# Patient Record
Sex: FEMALE | Race: WHITE | Employment: FULL TIME | URBAN - METROPOLITAN AREA
[De-identification: names, ages, dates, MRNs, and addresses within clinical notes are randomized per-mention and may not be internally consistent; named-entity substitution may affect disease eponyms.]

---

## 2020-10-01 ENCOUNTER — TELEPHONE (OUTPATIENT)
Dept: ADMINISTRATIVE | Age: 25
End: 2020-10-01

## 2020-10-12 ENCOUNTER — HOSPITAL ENCOUNTER (OUTPATIENT)
Age: 25
Discharge: HOME | End: 2020-10-14

## 2020-10-12 LAB
ALBUMIN SERPL-MCNC: 4.3 G/DL (ref 3.5–5.2)
ALP BLD-CCNC: 60 U/L (ref 35–104)
ALT SERPL-CCNC: 15 U/L (ref 0–32)
ANION GAP SERPL CALCULATED.3IONS-SCNC: 9 MMOL/L (ref 7–16)
AST SERPL-CCNC: 16 U/L (ref 0–31)
BILIRUB SERPL-MCNC: 0.2 MG/DL (ref 0–1.2)
BUN BLDV-MCNC: 12 MG/DL (ref 6–20)
CALCIUM SERPL-MCNC: 9.6 MG/DL (ref 8.6–10.2)
CHLORIDE BLD-SCNC: 103 MMOL/L (ref 98–107)
CHLORIDE URINE RANDOM: 110 MMOL/L
CO2: 25 MMOL/L (ref 22–29)
CREAT SERPL-MCNC: 0.8 MG/DL (ref 0.5–1)
GFR AFRICAN AMERICAN: >60
GFR NON-AFRICAN AMERICAN: >60 ML/MIN/1.73
GLUCOSE BLD-MCNC: 88 MG/DL (ref 74–99)
POTASSIUM SERPL-SCNC: 5 MMOL/L (ref 3.5–5)
POTASSIUM, UR: 21.7 MMOL/L
SODIUM BLD-SCNC: 137 MMOL/L (ref 132–146)
SODIUM URINE: 124 MMOL/L
TOTAL PROTEIN: 6.7 G/DL (ref 6.4–8.3)

## 2020-10-12 PROCEDURE — 82436 ASSAY OF URINE CHLORIDE: CPT

## 2020-10-12 PROCEDURE — 83935 ASSAY OF URINE OSMOLALITY: CPT

## 2020-10-12 PROCEDURE — 84300 ASSAY OF URINE SODIUM: CPT

## 2020-10-12 PROCEDURE — 84133 ASSAY OF URINE POTASSIUM: CPT

## 2020-10-12 PROCEDURE — 80053 COMPREHEN METABOLIC PANEL: CPT

## 2020-10-15 LAB — OSMOLALITY URINE: 478 MOSM/KG (ref 300–900)

## 2020-10-19 ENCOUNTER — HOSPITAL ENCOUNTER (OUTPATIENT)
Age: 25
Discharge: HOME | End: 2020-10-21

## 2020-10-19 LAB
ANION GAP SERPL CALCULATED.3IONS-SCNC: 15 MMOL/L (ref 7–16)
BACTERIA: ABNORMAL /HPF
BILIRUBIN URINE: NEGATIVE
BLOOD, URINE: NEGATIVE
BUN BLDV-MCNC: 9 MG/DL (ref 6–20)
CALCIUM SERPL-MCNC: 9.7 MG/DL (ref 8.6–10.2)
CHLORIDE BLD-SCNC: 104 MMOL/L (ref 98–107)
CLARITY: CLEAR
CO2: 23 MMOL/L (ref 22–29)
COLOR: YELLOW
CREAT SERPL-MCNC: 0.8 MG/DL (ref 0.5–1)
EPITHELIAL CELLS, UA: ABNORMAL /HPF
GFR AFRICAN AMERICAN: >60
GFR NON-AFRICAN AMERICAN: >60 ML/MIN/1.73
GLUCOSE BLD-MCNC: 93 MG/DL (ref 74–99)
GLUCOSE URINE: NEGATIVE MG/DL
KETONES, URINE: NEGATIVE MG/DL
LEUKOCYTE ESTERASE, URINE: ABNORMAL
NITRITE, URINE: POSITIVE
OSMOLALITY URINE: 266 MOSM/KG (ref 300–900)
OSMOLALITY: 292 MOSM/KG (ref 285–310)
PH UA: 6.5 (ref 5–9)
POTASSIUM SERPL-SCNC: 4.5 MMOL/L (ref 3.5–5)
PROTEIN UA: NEGATIVE MG/DL
RBC UA: ABNORMAL /HPF (ref 0–2)
SODIUM BLD-SCNC: 142 MMOL/L (ref 132–146)
SPECIFIC GRAVITY UA: 1.01 (ref 1–1.03)
UROBILINOGEN, URINE: 0.2 E.U./DL
WBC UA: ABNORMAL /HPF (ref 0–5)

## 2020-10-19 PROCEDURE — 83930 ASSAY OF BLOOD OSMOLALITY: CPT

## 2020-10-19 PROCEDURE — 81001 URINALYSIS AUTO W/SCOPE: CPT

## 2020-10-19 PROCEDURE — 80048 BASIC METABOLIC PNL TOTAL CA: CPT

## 2020-10-19 PROCEDURE — 84133 ASSAY OF URINE POTASSIUM: CPT

## 2020-10-19 PROCEDURE — 82436 ASSAY OF URINE CHLORIDE: CPT

## 2020-10-19 PROCEDURE — 84166 PROTEIN E-PHORESIS/URINE/CSF: CPT

## 2020-10-19 PROCEDURE — 83935 ASSAY OF URINE OSMOLALITY: CPT

## 2020-10-19 PROCEDURE — 84300 ASSAY OF URINE SODIUM: CPT

## 2020-10-23 LAB — ADDENDUM ELECTROPHORESIS URINE RANDOM: NORMAL

## 2020-11-02 ENCOUNTER — HOSPITAL ENCOUNTER (OUTPATIENT)
Dept: PHYSICAL THERAPY | Age: 25
Setting detail: THERAPIES SERIES
Discharge: HOME | End: 2020-11-02
Payer: COMMERCIAL

## 2020-11-04 ENCOUNTER — HOSPITAL ENCOUNTER (OUTPATIENT)
Dept: PHYSICAL THERAPY | Age: 25
Setting detail: THERAPIES SERIES
Discharge: HOME | End: 2020-11-04
Payer: COMMERCIAL

## 2020-11-04 PROCEDURE — 97161 PT EVAL LOW COMPLEX 20 MIN: CPT

## 2020-11-04 NOTE — PROGRESS NOTES
shoulder girdle elevation No acute guarding noted    PROM RUE (degrees)  RUE PROM: WFL  AROM RUE (degrees)  RUE AROM : WFL  PROM LUE (degrees)  LUE PROM: WFL  AROM LUE (degrees)  LUE AROM : WFL  Spine  Cervical: Flex limtied 20% Extension limited 30% Rotation limited 50% bilateral with pain    Strength RLE  Comment: 4-/5  Strength LLE  Comment: 4-/5  Strength Other  Other: Posterior cervical/scapular region 3+/5                                                   Assessment   Conditions Requiring Skilled Therapeutic Intervention  Body structures, Functions, Activity limitations: Decreased functional mobility ; Increased pain;Decreased posture;Decreased ROM; Decreased strength;Decreased endurance  Assessment: Acute postero-lateral cervicla pain with persistent headaches  Prognosis: Fair  Decision Making: Low Complexity  REQUIRES PT FOLLOW UP: Yes         Plan   Plan  Times per week: 2-3  Plan weeks: 4-5  Current Treatment Recommendations: Strengthening, Home Exercise Program, Manual Therapy - Soft Tissue Mobilization, ROM, Patient/Caregiver Education & Training, Modalities    G-Code       OutComes Score                                                  AM-PAC Score             Goals          Therapy Time   Individual Concurrent Group Co-treatment   Time In 1500         Time Out 1535         Minutes 35         Timed Code Treatment Minutes: 27 Minutes       Eze Ray, PT

## 2020-11-13 NOTE — FLOWSHEET NOTE
[facility]  Phone: [phone] Fax: [phone]     [facility] Initial Evaluation      Client Name:  Anshul Vale   Claim Number:[id number]  Evaluation date:11/04/2020                                     Job Title : LPN  Diagnosis:Cervical Injury                                       Normal Work Hrs:  40/wk  Referring Physician: Dr Darryl Castellano                          Present work status:   ________  First date of Lost time:09/22/2020                          * Not working  ___  Date of Injury: 09/22/2020                                         Darra Males Duty    ____  [place].  Duty     __X__    Authorized Services:   _X__ Physical Therapy Exercises  ___ Work Conditioning  ___ Aquatics  _X__ Manual therapy  _X__ Electrical Stimulation, Traction, Ultrasound  __X_ Education/Body mechanics  ___ Ergonomic Assessment/FCE    Authorized Visits: ________  Janak Martinez __12_____Visits  By ____12/04/2020  Janak Martinez From __11/04/2020___ to_12/04/2020    Vocational Status:  Employer: [employer]  Job Title: LPN     Rehabilitation Problems/Impairments:  [x]Pain  3-5/10 Posterior and postero-lateral cervical regions Headaches   [x]Decreased ROM:Cervical region   []Joint Hypomobility:______________________________________  []Joint Hypermobility:______________________________________  []Muscle Spasms:_________________________________________  []Gait: __________________________________________________  [x]Decreased strength:posterior cervical/scapualr region   []Unsafe body mechanics related to work/home activities  []Subjective reports of pain limiting work/home activities  []Inability to control onset symptoms  []Load handling strength levels below employable levels  []Work endurances less than required for employment levels  [x]Other:Postural deficits       Short Term Rehabilitation Goals:                    [x] Decrease pain 3/10:______________________________________                    [] Increase ROM:___________________________________________                    [] Improve Joint play:________________________________________                    [] Improve strength:_________________________________________                    []Gait: __________________________________________________                    [] Reduce Muscle Spasms:____________________________________                    [] Improve Body Mechanics associated with work/Home activities. [x] Instruct with symptom control techniques/ HEP                    [x] Improve endurance levels                     [] Lift floor-waist __________#                     [] Lift waist-shoulder _______#                    [] Horizontal carry _________Ft ________#                    [] Elevated work ________min _________#                    [] Other___________________________________    Long Term Rehabilitation Goals:   []RTW  ( SJ/SE, SJ/DE, DJ/SE, DJ/DE)  [x]Decrease Pain _2_/10 ____________________________________  [x]Improve AROM to [facility]   [x]Improve Strength to 3+ to 4-/5   []Improve Joint play (WNL), _______________________________  []Body Mechanics:________________________________________              []Gait ___________________________________________________  [x]Independent with HEP/Symptom control techniques:  []Lift Floor-waist_____#  []Lift waist-Shoulder________#  []Horizontal Carry ______ft______#  []Elevated work ____min____#  []Other:_________________________________________________    Rehabilitation Recommendations:                    ?[x] Begin PT at __2-3____x/wk x _4_____wks. []Begin PT with Work Conditioning to follow at ______wks. []Begin Work-Conditioning and conclude with FCE _____                   []? Begin PT with Vocational Rehabilitation referral/request--[facility]                   ? [] Ergonomic Study/Job Analysis to compare FCE/clients abilities. ____    Recommendations For RTW accommodations:  KIZZY          Physician: ____________________  Therapist: Loreen Dubin, [address] [facility]  : ____________________

## 2020-11-18 ENCOUNTER — HOSPITAL ENCOUNTER (OUTPATIENT)
Dept: PHYSICAL THERAPY | Age: 25
Setting detail: THERAPIES SERIES
Discharge: HOME | End: 2020-11-18
Payer: COMMERCIAL

## 2020-11-18 PROCEDURE — 97110 THERAPEUTIC EXERCISES: CPT

## 2020-11-18 PROCEDURE — G0283 ELEC STIM OTHER THAN WOUND: HCPCS

## 2020-11-18 NOTE — PROGRESS NOTES
Neuro Re-education NR     Modalities 20 1   Non-Billable Service Time     Other 10 --   Total Time/Units 50 2     Electronically signed by:   Laurel Waite

## 2020-11-20 ENCOUNTER — TELEPHONE (OUTPATIENT)
Dept: PHYSICAL MEDICINE AND REHAB | Age: 25
End: 2020-11-20

## 2020-11-20 ENCOUNTER — HOSPITAL ENCOUNTER (OUTPATIENT)
Dept: PHYSICAL THERAPY | Age: 25
Setting detail: THERAPIES SERIES
Discharge: HOME | End: 2020-11-20
Payer: COMMERCIAL

## 2020-11-20 NOTE — PROGRESS NOTES
[facility]  Phone: [phone] Fax: [phone]     Physical Therapy  Cancellation/No-show Note  Patient Name:  Bobby Sawyer  :  1995   Date:  2020    For today's appointment patient:  [x]  Cancelled  []  Rescheduled appointment  []  No-show     Reason given by patient:  []  Patient ill  []  Conflicting appointment  []  No transportation    []  Conflict with work  []  No reason given  [x]  Other:     Comments: Illness in the family                                    Next scheduled appointment 2020    Electronically signed by:  Bradley Schultz, [address] [facility]

## 2020-11-20 NOTE — TELEPHONE ENCOUNTER
Pt called she thought her appt for 11-19-20 was on 11-20-20. This is a NP concussion with impact testing.   Please call her back to r/s [phone]

## 2020-11-24 ENCOUNTER — HOSPITAL ENCOUNTER (OUTPATIENT)
Dept: PHYSICAL THERAPY | Age: 25
Setting detail: THERAPIES SERIES
Discharge: HOME | End: 2020-11-24
Payer: COMMERCIAL

## 2020-11-24 PROCEDURE — 97110 THERAPEUTIC EXERCISES: CPT

## 2020-11-24 NOTE — PROGRESS NOTES
[facility]  Phone: [phone] Fax: [phone]       Physical Therapy Daily Treatment Note  Date:  2020    Patient Name:  Magalie Love    :  1995  MRN: [id number]    Restrictions/Precautions:    Diagnosis:  Neck Injury  Treatment Diagnosis:    Insurance/Certification information: [employer] (12 visits thru 2020)   Referring Physician:  Dr. Muniz Risk of care signed (Y/N):    Visit# / total visits:  3/12  Pain level: /10  Time In: 13:30       Time Out: 14:05         Subjective:  Pt states she has no pain this afternoon. Pt reports she does not have a follow up scheduled with Dr. Paco Jerez. Advised her to schedule one. Exercises:  Exercise/Equipment Resistance/Repetitions Other comments   UBE 6 min Fwd/bckwd   Cervical rotation 3\" 5 x B    Upper trap stretch 3\" 5 x B    Levator stretch  3\" 3 x B    Doorway stretch  10\" 5 x    Thoracic stretch 10\" 5 x           Theraband pull downs   GTB 10 x                       rows GTB 10 x                     Shoulder shrugs GTB 10 x                     Scap retraction GTB 10 x                                                              Other Therapeutic Activities:      Home Exercise Program:      Manual Treatments:      Modalities: . Pt seated. NT no pain     Timed Code Treatment Minutes:  30    Total Treatment Minutes:  35    Treatment/Activity Tolerance:  [x] Patient tolerated treatment well [] Patient limited by fatigue  [] Patient limited by pain  [] Patient limited by other medical complications  [x] Other: Good tolerance for progression of exercises. No pain c/o.     Plan:   [x] Continue per plan of care [] Alter current plan (see comments)  [] Plan of care initiated [] Hold pending MD visit [] Discharge  Plan for Next Session:         Treatment Charges: Mins Units   Initial Evaluation     Re-Evaluation     Ther Exercise         TE 30 2   Manual Therapy     MT     Ther Activities        TA     Gait Training          GT     Neuro Re-education NR     Modalities     Non-Billable Service Time     Other 5 --   Total Time/Units 35 2     Electronically signed by:   Laurel Waite

## 2020-12-01 ENCOUNTER — HOSPITAL ENCOUNTER (OUTPATIENT)
Dept: PHYSICAL THERAPY | Age: 25
Setting detail: THERAPIES SERIES
Discharge: HOME | End: 2020-12-01
Payer: COMMERCIAL

## 2020-12-03 ENCOUNTER — HOSPITAL ENCOUNTER (OUTPATIENT)
Dept: PHYSICAL THERAPY | Age: 25
Setting detail: THERAPIES SERIES
Discharge: HOME | End: 2020-12-03
Payer: COMMERCIAL

## 2020-12-03 PROCEDURE — 97110 THERAPEUTIC EXERCISES: CPT

## 2020-12-03 NOTE — PROGRESS NOTES
[facility]  Phone: [phone] Fax: [phone]       Physical Therapy Daily Treatment Note  Date:  12/3/2020    Patient Name:  Bobby Sawyer    :  1995  MRN: [id number]    Restrictions/Precautions:    Diagnosis:  Neck Injury  Treatment Diagnosis:    Insurance/Certification information: [employer] (12 visits thru 2020)   Referring Physician:  Dr. Christa Son of care signed (Y/N):    Visit# / total visits:    Pain level: /10  Time In: 14:05       Time Out: 14:35         Subjective:  Pt states she has no pain this afternoon. Pt is to schedule a follow up with Dr. Akil Strauss. End date for services extended to 20. Exercises:  Exercise/Equipment Resistance/Repetitions Other comments   UBE 8 min Fwd/bckwd   Cervical rotation 3\" 5 x B    Upper trap stretch 3\" 5 x B    Levator stretch  3\" 3 x B    Doorway stretch  10\" 5 x    Thoracic stretch 10\" 5 x           Theraband pull downs   GTB 15 x                       rows GTB 15 x                     Shoulder shrugs GTB 10 x                     Scap retraction GTB 10 x                                                              Other Therapeutic Activities:      Home Exercise Program:      Manual Treatments:      Modalities: . Pt seated. NT no pain     Timed Code Treatment Minutes:  30    Total Treatment Minutes:  30    Treatment/Activity Tolerance:  [x] Patient tolerated treatment well [] Patient limited by fatigue  [] Patient limited by pain  [] Patient limited by other medical complications  [] Other:     Plan:   [x] Continue per plan of care [] Alter current plan (see comments)  [] Plan of care initiated [] Hold pending MD visit [] Discharge  Plan for Next Session:         Treatment Charges: Mins Units   Initial Evaluation     Re-Evaluation     Ther Exercise         TE 30 2   Manual Therapy     MT     Ther Activities        TA     Gait Training          GT     Neuro Re-education NR     Modalities

## 2020-12-10 ENCOUNTER — HOSPITAL ENCOUNTER (OUTPATIENT)
Dept: PHYSICAL THERAPY | Age: 25
Setting detail: THERAPIES SERIES
Discharge: HOME | End: 2020-12-10
Payer: COMMERCIAL